# Patient Record
Sex: MALE | Race: OTHER | HISPANIC OR LATINO | URBAN - METROPOLITAN AREA
[De-identification: names, ages, dates, MRNs, and addresses within clinical notes are randomized per-mention and may not be internally consistent; named-entity substitution may affect disease eponyms.]

---

## 2022-12-29 ENCOUNTER — EMERGENCY (EMERGENCY)
Facility: HOSPITAL | Age: 42
LOS: 1 days | Discharge: HOME | End: 2022-12-29
Admitting: STUDENT IN AN ORGANIZED HEALTH CARE EDUCATION/TRAINING PROGRAM
Payer: SELF-PAY

## 2022-12-29 VITALS
RESPIRATION RATE: 15 BRPM | HEART RATE: 106 BPM | OXYGEN SATURATION: 100 % | TEMPERATURE: 99 F | SYSTOLIC BLOOD PRESSURE: 146 MMHG | DIASTOLIC BLOOD PRESSURE: 87 MMHG

## 2022-12-29 PROCEDURE — 99283 EMERGENCY DEPT VISIT LOW MDM: CPT

## 2022-12-29 PROCEDURE — 99053 MED SERV 10PM-8AM 24 HR FAC: CPT

## 2022-12-30 PROCEDURE — 73564 X-RAY EXAM KNEE 4 OR MORE: CPT | Mod: 26,LT

## 2022-12-30 RX ORDER — IBUPROFEN 200 MG
600 TABLET ORAL ONCE
Refills: 0 | Status: COMPLETED | OUTPATIENT
Start: 2022-12-30 | End: 2022-12-30

## 2022-12-30 RX ORDER — OXYCODONE AND ACETAMINOPHEN 5; 325 MG/1; MG/1
1 TABLET ORAL
Qty: 12 | Refills: 0
Start: 2022-12-30

## 2022-12-30 RX ORDER — IBUPROFEN 200 MG
1 TABLET ORAL
Qty: 25 | Refills: 0
Start: 2022-12-30

## 2022-12-30 RX ADMIN — Medication 600 MILLIGRAM(S): at 01:14

## 2022-12-30 NOTE — ED PROVIDER NOTE - PATIENT PORTAL LINK FT
You can access the FollowMyHealth Patient Portal offered by [facility] by registering at the following website: [email or web address]. By joining [facility])’s FollowMyHealth portal, you will also be able to view your health information using other applications (apps) compatible with our system.

## 2022-12-30 NOTE — ED PROVIDER NOTE - ADDITIONAL NOTES AND INSTRUCTIONS:
Please excuse the absence of Mr. Lamas. He was evaluated in the emergency room.  He may return to work on January 6, 2023.

## 2022-12-30 NOTE — ED PROVIDER NOTE - MUSCULOSKELETAL, MLM
knee: Positive swelling and tenderness to the medial aspect of knee.  Flexion extension intact.  No valgus/valgus laxity.  No obvious bony deformities freely moving other joints of left lower extremitySpine appears normal, range of motion is not limited, no muscle or joint tenderness

## 2022-12-30 NOTE — ED PROVIDER NOTE - CLINICAL SUMMARY MEDICAL DECISION MAKING FREE TEXT BOX
42-year-old male presenting with a complaint of left knee pain status post skiing injury.  On presentation patient in obvious distress, difficulty ambulating.  Symptoms likely due to sprain.  Will provide analgesics and x-ray to rule out fracture.

## 2022-12-30 NOTE — ED PROVIDER NOTE - NSFOLLOWUPINSTRUCTIONS_ED_ALL_ED_FT
Please follow up with orthopedic clinic    Sprain    A sprain is a stretch or tear in one of the tough, fiber-like tissues (ligaments) in your body. This is caused by an injury to the area such as a twisting mechanism. Symptoms include pain, swelling, or bruising. Rest that area over the next several days and slowly resume activity when tolerated. Ice can help with swelling and pain.     SEEK IMMEDIATE MEDICAL CARE IF YOU HAVE ANY OF THE FOLLOWING SYMPTOMS: worsening pain, inability to move that body part, numbness or tingling.

## 2023-04-10 NOTE — ED PROVIDER NOTE - WR ORDER ID 1
Plan: Sunscreen SPF 30 or greater and reapply q3h when outdoors.\\nHats, protective clothing, and seek shade when possible. Detail Level: Zone [id number]

## 2024-01-31 NOTE — ED PROVIDER NOTE - OBJECTIVE STATEMENT
143 This patient is a 42-year-old male presenting with a complaint of left knee pain status post skiing injury.  He reports twisting his knee while skiing, experiencing intense pain and difficulty ambulating.  He denies sustaining any other significant injuries.  No associated numbness weakness loss of motor function